# Patient Record
Sex: FEMALE | Race: WHITE | URBAN - METROPOLITAN AREA
[De-identification: names, ages, dates, MRNs, and addresses within clinical notes are randomized per-mention and may not be internally consistent; named-entity substitution may affect disease eponyms.]

---

## 2019-06-25 ENCOUNTER — APPOINTMENT (RX ONLY)
Dept: URBAN - METROPOLITAN AREA CLINIC 61 | Facility: CLINIC | Age: 69
Setting detail: DERMATOLOGY
End: 2019-06-25

## 2019-06-25 DIAGNOSIS — L82.1 OTHER SEBORRHEIC KERATOSIS: ICD-10-CM

## 2019-06-25 DIAGNOSIS — D36.1 BENIGN NEOPLASM OF PERIPHERAL NERVES AND AUTONOMIC NERVOUS SYSTEM: ICD-10-CM

## 2019-06-25 DIAGNOSIS — L30.4 ERYTHEMA INTERTRIGO: ICD-10-CM

## 2019-06-25 PROBLEM — I10 ESSENTIAL (PRIMARY) HYPERTENSION: Status: ACTIVE | Noted: 2019-06-25

## 2019-06-25 PROBLEM — D36.12 BENIGN NEOPLASM OF PERIPHERAL NERVES AND AUTONOMIC NERVOUS SYSTEM, UPPER LIMB, INCLUDING SHOULDER: Status: ACTIVE | Noted: 2019-06-25

## 2019-06-25 PROCEDURE — ? COUNSELING

## 2019-06-25 PROCEDURE — 99202 OFFICE O/P NEW SF 15 MIN: CPT

## 2019-06-25 PROCEDURE — ? TREATMENT REGIMEN

## 2019-06-25 ASSESSMENT — LOCATION SIMPLE DESCRIPTION DERM
LOCATION SIMPLE: RIGHT PRETIBIAL REGION
LOCATION SIMPLE: RIGHT SHOULDER
LOCATION SIMPLE: LEFT BREAST

## 2019-06-25 ASSESSMENT — PAIN INTENSITY VAS: HOW INTENSE IS YOUR PAIN 0 BEING NO PAIN, 10 BEING THE MOST SEVERE PAIN POSSIBLE?: NO PAIN

## 2019-06-25 ASSESSMENT — LOCATION DETAILED DESCRIPTION DERM
LOCATION DETAILED: RIGHT DISTAL PRETIBIAL REGION
LOCATION DETAILED: LEFT INFRAMAMMARY CREASE (INNER QUADRANT)
LOCATION DETAILED: RIGHT ANTERIOR SHOULDER

## 2019-06-25 ASSESSMENT — SEVERITY ASSESSMENT: SEVERITY: MILD

## 2019-06-25 ASSESSMENT — LOCATION ZONE DERM
LOCATION ZONE: ARM
LOCATION ZONE: TRUNK
LOCATION ZONE: LEG

## 2019-06-25 NOTE — PROCEDURE: TREATMENT REGIMEN
Otc Regimen: Zeasorb powder
Detail Level: Zone
Continue Regimen: Can continue using Nystatin cream preciously prescribed by PCP

## 2019-06-25 NOTE — HPI: EVALUATION OF SKIN LESION(S)
How Severe Are Your Spot(S)?: mild
Have Your Spot(S) Been Treated In The Past?: has not been treated
Hpi Title: Evaluation of Skin Lesions
Statement Selected
Year Removed: 1900

## 2019-08-16 ENCOUNTER — HOSPITAL ENCOUNTER (OUTPATIENT)
Dept: HOSPITAL 61 - SURG | Age: 69
Discharge: HOME | End: 2019-08-16
Attending: ORTHOPAEDIC SURGERY
Payer: MEDICARE

## 2019-08-16 VITALS — WEIGHT: 210 LBS | HEIGHT: 66 IN | BODY MASS INDEX: 33.75 KG/M2

## 2019-08-16 VITALS — DIASTOLIC BLOOD PRESSURE: 56 MMHG | SYSTOLIC BLOOD PRESSURE: 129 MMHG

## 2019-08-16 DIAGNOSIS — S83.272A: Primary | ICD-10-CM

## 2019-08-16 DIAGNOSIS — S83.242A: ICD-10-CM

## 2019-08-16 DIAGNOSIS — I10: ICD-10-CM
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
## 2019-08-16 DIAGNOSIS — M94.262: ICD-10-CM
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
## 2019-08-16 PROCEDURE — 97530 THERAPEUTIC ACTIVITIES: CPT

## 2019-08-16 PROCEDURE — 97162 PT EVAL MOD COMPLEX 30 MIN: CPT

## 2019-08-16 PROCEDURE — 97110 THERAPEUTIC EXERCISES: CPT

## 2019-08-16 PROCEDURE — 29880 ARTHRS KNE SRG MNISECTMY M&L: CPT

## 2019-08-16 PROCEDURE — A7015 AEROSOL MASK USED W NEBULIZE: HCPCS

## 2019-08-16 PROCEDURE — C1782 MORCELLATOR: HCPCS

## 2019-08-16 NOTE — DISCH
DISCHARGE INSTRUCTIONS


Condition on Discharge


Condition on Discharge:  Stable





Activity After Discharge


Activity Instructions for Disc:  Other, see below (slow advance her activities 

as tolerated)





Diet after Discharge


Diet after Discharge:  Regular





Wound Incision Care


Wound/Incision Care:  Change dressing (remove dressing in 2 days may then shower

no soaking until sutures removed)





Contacting the  after DC


Call your doctor for:  Concerns you may have





Follow-Up


Follow up with:  Dr. Chin 10 days











KAREN CHIN MD           Aug 16, 2019 09:32

## 2019-08-16 NOTE — PDOC4
Operative Note


Operative Note


Date of surgery: 8/16/2019





Preoperative diagnosis: Left knee meniscus tear





Postoperative diagnosis: Same with medial and lateral meniscal tears and grade 3

chondromalacia patellofemoral joint and medial compartment





Operative procedure: Left knee arthroscopy partial medial and lateral 

meniscectomy





Surgeon: Elsy





Anesthesia: Gen.





Estimated blood loss: 5 mL





Complications: None





Operative indications: Patient is a 69-year-old female that remains very active 

and had clinical findings consistent with MRI evaluation showing meniscal tear. 

I had discussed in detail as documented in my preoperative clinic visit 

regarding the structure and function the meniscus possibility of arthroscopic 

removal of the damaged portion of the meniscus the fact that I could not undo 

any degenerative changes and therefore she may have continued pain possibility 

of infection nerve or blood vessel damage medical or other anesthetic 

complications among others she agrees to proceed with surgical evaluation and 

treatment





Operative text: Patient was identified procedure verified patient placed in the 

supine position on the operating table. After adequate amounts of general 

anesthesia were administered the left lower extremity was prepped and draped in 

standard sterile fashion with a thigh tourniquet. During the timeout process it 

was identified that the required knee arthroscopy instrumentation was not 

available as the sterile processing had not been completed. Although the 

tourniquet had been initially inflated to 300 mmHg I elected not to proceed with

any surgical incision as the situation with the instrumentation was sorted out 

tourniquet was then deflated and I elected to wait until the proper 

instrumentation was flashed in the sterilizer as I judged that approach would 

result in less risk overall to the patient then potentially undergoing 2 separ

ate procedures as she was already under anesthesia. After the instrumentation 

was available the left lower extremity was again exsanguinated by Esmarch 

bandage tourniquet inflated to 300 mmHg a lateral portal was established a 

medial portal established using spinal needle localization and the knee joint 

was systematically examined. She was found to have a displaceable tear of the p

osterior horn of the medial meniscus which was trimmed back to stable tissue 

using arthroscopic punch and shaver. She did have grade 3 chondromalacia of both

the medial and lateral femoral condyles not requiring debridement but did have 

more extensive complex tearing of the lateral meniscus which was trimmed back to

stable tissue again using arthroscopic punch and shaver. Patellofemoral joint 

was noted to have significant grade 3-4 chondromalacia not requiring debridement

no loose bodies were noted in the gutters or suprapatellar pouch ACL was probed 

and found to be intact the knee joint was drained of arthroscopic fluid portals 

closed with nylon suture. A total of 30 mL half percent plain Marcaine were 

injected into the portal areas fat pad and surrounding tissues sterile dressings

were applied patient was returned recovery room stable condition having 

tolerated procedure well. Toes were noted be warm pink find deflation of the 

tourniquet after total tourniquet time of approximately 12 minutes during the 

procedure and an additional several minutes as above while waiting for the 

instrumentation initially











KAREN LANIER MD           Aug 16, 2019 23:19